# Patient Record
Sex: FEMALE | Race: BLACK OR AFRICAN AMERICAN | Employment: FULL TIME | URBAN - METROPOLITAN AREA
[De-identification: names, ages, dates, MRNs, and addresses within clinical notes are randomized per-mention and may not be internally consistent; named-entity substitution may affect disease eponyms.]

---

## 2020-02-10 ENCOUNTER — MEDICAL CORRESPONDENCE (OUTPATIENT)
Dept: HEALTH INFORMATION MANAGEMENT | Facility: CLINIC | Age: 65
End: 2020-02-10

## 2020-02-24 ENCOUNTER — OFFICE VISIT (OUTPATIENT)
Dept: NUTRITION | Facility: CLINIC | Age: 65
End: 2020-02-24
Payer: COMMERCIAL

## 2020-02-24 DIAGNOSIS — I10 BENIGN ESSENTIAL HYPERTENSION: Primary | ICD-10-CM

## 2020-02-24 DIAGNOSIS — E66.9 OBESITY: ICD-10-CM

## 2020-02-24 PROCEDURE — 97802 MEDICAL NUTRITION INDIV IN: CPT

## 2020-02-24 NOTE — PATIENT INSTRUCTIONS
"Recommendations:  1) aim for 3 balanced meals each day- with protein (focus on beans, tofu, or lean meat), produce (fruit and veg), and starch (whole grains)  2) choose low fat foods (like fat free dressing), avoid fried foods, choose steamed or baked  3) choose low salt- more \"fresh\" foods like lean meat, fruit, veggie, grains, and rinse any canned goods (beans) to get salt off  4) aim for 30 minutes daily of \"on purpose\" exercise      "

## 2020-02-24 NOTE — PROGRESS NOTES
"Medical Nutrition Therapy  Visit Type:Initial assessment and intervention    Alexa Woods presents today for MNT and education related to hypertension and obesity per referral.    She is accompanied by self.     ASSESSMENT:   Patient comments/concerns relating to nutrition:  Reports \"fibroids in stomach\", and says she had surgery in 2009 for this. She then clarifies that she can't have periods any more, that it was a partial hysterectomy. Marie explains that since then her stomach has been large, \"it looks like I'm pregnant\" and she wants a good diet to lose that bulge.  Says she doesn't eat right or exercise, so interested in healthy safe meal plan for weight loss. Particularly off her stomach.   Asked about high blood pressure, \"sometimes I don't eat right or get enough rest\" which she believes contributes. Takes Lisinopril 10 mg/day for HTN.  Denies prior nutrition ed.     NUTRITION HISTORY:  She does use decaf coffee with HTN history.   \"I have to watch salt and sugar\" = \"very little to none\" . \"Sometimes I bring things home to help, like dark chocolate, it helps my heart\".  Works at [employer], they have breakfast available, she starts work at 5:30. Then goes home for a nap and lunch, then to second job at [employer] as .  Cooks for self at home or picks up carry out on occasion.  Breakfast: decaf coffee, usually no cream; eats at work \"a sensible meal with eggs and hash browns or turkey sausage, no pork\"   Lunch around 12: at home- has a salad, [employer] bag that has greens, nuts, dressing- eats half- adds other veg like tomato, cucumber- no added protein, upon question she says she uses fat free dressing.  Another day might have \"a sandwich or a Polish\" (sausage/brat)   Dinner around 8 PM: an orange or apple on work break  Home about 10:30 PM or so  Snacks: yes, but I want to choose healthy snacks- does not disclose snack foods eaten  Beverages: OJ/day lately for a cold, drinks cranberry juice 8 " oz/day, Coffee decaf/day and Water all day    Misses meals? Yes dinner- eats a fruit at work  Eats out:  2 meals/per week - gets Chinese= shrimp fried rice    Previous diet education:  No     Food allergies/intolerances: avoiding dairy for fibroids, no pork for blood pressure    Depending upon the day; did not bring written food record, daily intake reported would be <500 calories some days if only eating a salad for lunch and a fruit for dinner, but uncertain about frequency of high lina/fat food intake.  Diet is high in: calories, fat (saturated) and sodium  Diet is low in: carbs, fiber, fruits, protein and vegetables    EXERCISE: no regular exercise program, was doing once or twice a week a couple years ago and believes it helped with HTN   Says she can exercise physically, just hasn't.   We discussed that exercise plays a role in health and healthy weight, recommended 30 minutes 5 days/week of purposeful activity. Reminded we can't choose where the weight comes off- if she would like to lose around abdomen, will have to improve nutrition and increase activity for whole body health.    SOCIO/ECONOMIC:   Lives with: self    MEDICATIONS:  Current Outpatient Medications   Medication     AMLODIPINE BESYLATE PO     HYDROcodone-acetaminophen (NORCO) 5-325 MG per tablet     lisinopril (PRINIVIL,ZESTRIL) 10 MG tablet     methylprednisoLONE (MEDROL DOSEPACK) 4 MG tablet     No current facility-administered medications for this visit.        LABS:  Last Basic Metabolic Panel:  No results found for: NA   No results found for: POTASSIUM  No results found for: CHLORIDE  No results found for: LINA  No results found for: CO2  No results found for: BUN  No results found for: CR  No results found for: GLC    ANTHROPOMETRICS:  Vitals: There were no vitals taken for this visit.  There is no height or weight on file to calculate BMI.      Wt Readings from Last 5 Encounters:   09/04/14 77.6 kg (171 lb)       ESTIMATED KCAL  "REQUIREMENTS:  Not calculated today, she does not have a phone or computer to use a weight loss jey or calorie tracking web site    NUTRITION DIAGNOSIS: Food- and nutrition-related knowledge deficit related to no prior nutrition education, questions asked as evidenced by foods eaten, obesity dx    NUTRITION INTERVENTION:  Education given to support: weight reduction, sodium and fat modification.  Identified high calorie and high sodium foods in the diet recall (fried hash browns, Chinese food with fried rice, juice, sausage etc.)    Discussed balanced eating to meet nutrition needs- fruit, veg, lean protein or plant protein, and starch. It does not need to be a big meal, like at work add a few nuts to the fruit at dinner to meet protein needs. Choose fruit with eggs rather than the fried hashbrowns, choose brown rice when dining out and add steamed veggies to unbreaded shrimp    Provided \"Eating to Feel Your best\", plate planner, tips for eating low sodium, and seasoning foods without salt handout    PATIENT'S BEHAVIOR CHANGE GOALS:   See Patient Instructions for patient stated behavior change goals. AVS was printed and given to patient at today's appointment.    Marie rates herself a \"5\" on a scale of 1-5 on interest in making healthy changes.   She nods off during our appointment so uncertain about information retention.     MONITOR / EVALUATE:  RD will monitor/evaluate:  Food and nutrition knowledge / skills  Progress toward meeting stated nutrition-related goals    FOLLOW-UP:  Follow up with RD as needed.    Soco Isabel RD, LD, CDE  Time spent in minutes: 50   Encounter: Individual    "